# Patient Record
Sex: FEMALE | Race: WHITE
[De-identification: names, ages, dates, MRNs, and addresses within clinical notes are randomized per-mention and may not be internally consistent; named-entity substitution may affect disease eponyms.]

---

## 2017-06-17 ENCOUNTER — HEALTH MAINTENANCE LETTER (OUTPATIENT)
Age: 52
End: 2017-06-17

## 2021-11-29 ENCOUNTER — TRANSFERRED RECORDS (OUTPATIENT)
Dept: HEALTH INFORMATION MANAGEMENT | Facility: CLINIC | Age: 56
End: 2021-11-29
Payer: COMMERCIAL

## 2021-12-03 ENCOUNTER — TRANSCRIBE ORDERS (OUTPATIENT)
Dept: OTHER | Age: 56
End: 2021-12-03
Payer: COMMERCIAL

## 2021-12-03 DIAGNOSIS — K74.60 CIRRHOSIS OF LIVER WITH ASCITES, UNSPECIFIED HEPATIC CIRRHOSIS TYPE (H): ICD-10-CM

## 2021-12-03 DIAGNOSIS — R18.8 CIRRHOSIS OF LIVER WITH ASCITES, UNSPECIFIED HEPATIC CIRRHOSIS TYPE (H): ICD-10-CM

## 2021-12-03 DIAGNOSIS — B18.2 CHRONIC HEPATITIS C WITHOUT HEPATIC COMA (H): Primary | ICD-10-CM

## 2021-12-10 ENCOUNTER — TELEPHONE (OUTPATIENT)
Dept: GASTROENTEROLOGY | Facility: CLINIC | Age: 56
End: 2021-12-10
Payer: COMMERCIAL

## 2021-12-10 DIAGNOSIS — B19.20 HEPATITIS C: Primary | ICD-10-CM

## 2021-12-10 DIAGNOSIS — B19.20 DECOMPENSATED CIRRHOSIS RELATED TO HEPATITIS C VIRUS (HCV) (H): ICD-10-CM

## 2021-12-10 DIAGNOSIS — K74.69 DECOMPENSATED CIRRHOSIS RELATED TO HEPATITIS C VIRUS (HCV) (H): ICD-10-CM

## 2021-12-10 DIAGNOSIS — K70.31 ALCOHOLIC CIRRHOSIS OF LIVER WITH ASCITES (H): ICD-10-CM

## 2021-12-10 NOTE — TELEPHONE ENCOUNTER
Returned patient's call.  Patient say she had recent paracentesis 2 weeks ago at [facility] and has been filling up.  Patient says she is really sore at site were needle was inserted so is hesitant to get another paracentesis.  Was seen by PCP and started on Thursday spironolactone 100 mg and furosemide 40 mg daily. Eating small amounts at a time.  Comfort level is worse at night.  Has hernia and pushes out from fullness.   Has acid reflux as well.      Is requesting a sooner appointment.  Let her know Grete is okay to see her.     Nery KAT LPN  Hepatology Clinic     [facility]    Phone Message    May a detailed message be left on voicemail: yes     Reason for Call: Symptoms or Concerns     Current symptom or concern: stomach is filling up with fluid    appt is 1/6/2022 and on wait list.  Please call to discuss    Symptoms have been present for:  1 week(s)    Has patient previously been seen for this? No      Are there any new or worsening symptoms? Yes:        Action Taken: Message routed to:  [facility] hep    Travel Screening: Not Applicable

## 2021-12-14 ENCOUNTER — LAB (OUTPATIENT)
Dept: LAB | Facility: CLINIC | Age: 56
End: 2021-12-14
Attending: PHYSICIAN ASSISTANT
Payer: COMMERCIAL

## 2021-12-14 ENCOUNTER — PRE VISIT (OUTPATIENT)
Dept: GASTROENTEROLOGY | Facility: CLINIC | Age: 56
End: 2021-12-14
Payer: COMMERCIAL

## 2021-12-14 ENCOUNTER — OFFICE VISIT (OUTPATIENT)
Dept: GASTROENTEROLOGY | Facility: CLINIC | Age: 56
End: 2021-12-14
Attending: PHYSICIAN ASSISTANT
Payer: COMMERCIAL

## 2021-12-14 VITALS
SYSTOLIC BLOOD PRESSURE: 109 MMHG | DIASTOLIC BLOOD PRESSURE: 66 MMHG | WEIGHT: 131.6 LBS | OXYGEN SATURATION: 96 % | HEIGHT: 60 IN | BODY MASS INDEX: 25.84 KG/M2 | HEART RATE: 66 BPM

## 2021-12-14 DIAGNOSIS — K74.60 CIRRHOSIS OF LIVER WITH ASCITES, UNSPECIFIED HEPATIC CIRRHOSIS TYPE (H): ICD-10-CM

## 2021-12-14 DIAGNOSIS — B19.20 DECOMPENSATED CIRRHOSIS RELATED TO HEPATITIS C VIRUS (HCV) (H): ICD-10-CM

## 2021-12-14 DIAGNOSIS — R18.8 CIRRHOSIS OF LIVER WITH ASCITES, UNSPECIFIED HEPATIC CIRRHOSIS TYPE (H): ICD-10-CM

## 2021-12-14 DIAGNOSIS — B18.2 CHRONIC HEPATITIS C WITHOUT HEPATIC COMA (H): ICD-10-CM

## 2021-12-14 DIAGNOSIS — K74.69 DECOMPENSATED CIRRHOSIS RELATED TO HEPATITIS C VIRUS (HCV) (H): ICD-10-CM

## 2021-12-14 DIAGNOSIS — B18.2 CHRONIC HEPATITIS C WITHOUT HEPATIC COMA (H): Primary | ICD-10-CM

## 2021-12-14 LAB
ALBUMIN SERPL-MCNC: 3 G/DL (ref 3.4–5)
ALP SERPL-CCNC: 164 U/L (ref 40–150)
ALT SERPL W P-5'-P-CCNC: 32 U/L (ref 0–50)
ANION GAP SERPL CALCULATED.3IONS-SCNC: 7 MMOL/L (ref 3–14)
AST SERPL W P-5'-P-CCNC: 44 U/L (ref 0–45)
BILIRUB DIRECT SERPL-MCNC: 0.7 MG/DL (ref 0–0.2)
BILIRUB SERPL-MCNC: 1.5 MG/DL (ref 0.2–1.3)
BUN SERPL-MCNC: 7 MG/DL (ref 7–30)
CALCIUM SERPL-MCNC: 8.2 MG/DL (ref 8.5–10.1)
CHLORIDE BLD-SCNC: 97 MMOL/L (ref 94–109)
CO2 SERPL-SCNC: 29 MMOL/L (ref 20–32)
CREAT SERPL-MCNC: 0.67 MG/DL (ref 0.52–1.04)
ERYTHROCYTE [DISTWIDTH] IN BLOOD BY AUTOMATED COUNT: 13.9 % (ref 10–15)
GFR SERPL CREATININE-BSD FRML MDRD: >90 ML/MIN/1.73M2
GLUCOSE BLD-MCNC: 192 MG/DL (ref 70–99)
HBA1C MFR BLD: 4.5 % (ref 0–5.6)
HCT VFR BLD AUTO: 37.9 % (ref 35–47)
HGB BLD-MCNC: 12.9 G/DL (ref 11.7–15.7)
INR PPP: 1.43 (ref 0.85–1.15)
MCH RBC QN AUTO: 32.7 PG (ref 26.5–33)
MCHC RBC AUTO-ENTMCNC: 34 G/DL (ref 31.5–36.5)
MCV RBC AUTO: 96 FL (ref 78–100)
PLATELET # BLD AUTO: 50 10E3/UL (ref 150–450)
POTASSIUM BLD-SCNC: 3.5 MMOL/L (ref 3.4–5.3)
PROT SERPL-MCNC: 7.2 G/DL (ref 6.8–8.8)
RBC # BLD AUTO: 3.94 10E6/UL (ref 3.8–5.2)
SODIUM SERPL-SCNC: 133 MMOL/L (ref 133–144)
WBC # BLD AUTO: 3.3 10E3/UL (ref 4–11)

## 2021-12-14 PROCEDURE — 83036 HEMOGLOBIN GLYCOSYLATED A1C: CPT | Performed by: PATHOLOGY

## 2021-12-14 PROCEDURE — G0463 HOSPITAL OUTPT CLINIC VISIT: HCPCS

## 2021-12-14 PROCEDURE — 99205 OFFICE O/P NEW HI 60 MIN: CPT | Performed by: PHYSICIAN ASSISTANT

## 2021-12-14 PROCEDURE — 85027 COMPLETE CBC AUTOMATED: CPT | Performed by: PATHOLOGY

## 2021-12-14 PROCEDURE — 85610 PROTHROMBIN TIME: CPT | Performed by: PATHOLOGY

## 2021-12-14 PROCEDURE — 87522 HEPATITIS C REVRS TRNSCRPJ: CPT | Mod: 90 | Performed by: PATHOLOGY

## 2021-12-14 PROCEDURE — 87902 NFCT AGT GNTYP ALYS HEP C: CPT | Mod: 90 | Performed by: PATHOLOGY

## 2021-12-14 PROCEDURE — 99000 SPECIMEN HANDLING OFFICE-LAB: CPT | Performed by: PATHOLOGY

## 2021-12-14 PROCEDURE — 82248 BILIRUBIN DIRECT: CPT | Performed by: PATHOLOGY

## 2021-12-14 PROCEDURE — 36415 COLL VENOUS BLD VENIPUNCTURE: CPT | Performed by: PATHOLOGY

## 2021-12-14 PROCEDURE — 80053 COMPREHEN METABOLIC PANEL: CPT | Performed by: PATHOLOGY

## 2021-12-14 RX ORDER — SPIRONOLACTONE 100 MG/1
100 TABLET, FILM COATED ORAL
COMMUNITY
Start: 2021-12-07 | End: 2021-12-14

## 2021-12-14 RX ORDER — FUROSEMIDE 40 MG
40 TABLET ORAL
COMMUNITY
Start: 2021-12-07 | End: 2021-12-14

## 2021-12-14 RX ORDER — FUROSEMIDE 40 MG
40 TABLET ORAL 2 TIMES DAILY
Qty: 60 TABLET | Refills: 5 | Status: SHIPPED | OUTPATIENT
Start: 2021-12-14 | End: 2021-12-22

## 2021-12-14 RX ORDER — SPIRONOLACTONE 100 MG/1
100 TABLET, FILM COATED ORAL 2 TIMES DAILY
Qty: 60 TABLET | Refills: 5 | Status: SHIPPED | OUTPATIENT
Start: 2021-12-14 | End: 2021-12-22

## 2021-12-14 ASSESSMENT — PAIN SCALES - GENERAL: PAINLEVEL: NO PAIN (0)

## 2021-12-14 ASSESSMENT — MIFFLIN-ST. JEOR: SCORE: 1102.4

## 2021-12-14 NOTE — TELEPHONE ENCOUNTER
RECORDS RECEIVED FROM: Care Everywhere   Appt Date: 12.14.2021   NOTES STATUS DETAILS   OFFICE NOTE from referring provider Care Everywhere 11.29.2021 Adela Sow PA-C   OFFICE NOTES from other specialists N/A    DISCHARGE SUMMARY from hospital N/A    MEDICATION LIST Care Everywhere    LIVER BIOSPY (IF APPLICABLE)      PATHOLOGY REPORTS  N/A    IMAGING     ENDOSCOPY (IF AVAILABLE) N/A    COLONOSCOPY (IF AVAILABLE) Care Everywhere 01.29.2019   ULTRASOUND LIVER N/A    CT OF ABDOMEN N/A    MRI OF LIVER N/A    FIBROSCAN, US ELASTOGRAPHY, FIBROSIS SCAN, MR ELASTOGRAPHY N/A    LABS     HEPATIC PANEL (LIVER PANEL) Care Everywhere 11.19.2021   BASIC METABOLIC PANEL Care Everywhere 11.19.2019   COMPLETE METABOLIC PANEL Care Everywhere 01.07.2019   COMPLETE BLOOD COUNT (CBC) Care Everywhere 01.07.2019   INTERNATIONAL NORMALIZED RATIO (INR) Care Everywhere 11.24.2021   HEPATITIS C ANTIBODY N/A    HEPATITIS C VIRAL LOAD/PCR N/A    HEPATITIS C GENOTYPE N/A    HEPATITIS B SURFACE ANTIGEN N/A    HEPATITIS B SURFACE ANTIBODY N/A    HEPATITIS B DNA QUANT LEVEL N/A    HEPATITIS B CORE ANTIBODY N/A

## 2021-12-14 NOTE — PROGRESS NOTES
Hepatology Clinic Note  Mu Gutierrez   Date of Birth 1965  Date of Service 12/14/2021    REASON FOR CONSULTATION: Hepatitis C  REFERRING PROVIDER: Nicanor Allred MD          Assessment/plan:   Mu Gutierrez is a 56 year old female with Hepatitis C cirrhosis, genotype 1a, treatment naive and positive Hep B core antibody. MELD-Na 16. She has recently decompensated with ascites and underwent one paracentesis recently. No overt hepatic encephalopathy. We discussed cirrhosis and complications.     We discussed the natural course of Hepatitis C virus and the benefits of treating the disease. We discussed the treatment regimen and medication side effects. Patient would be a good candidate for Hepatitis C treatment to prevent worsening decompensation and to prevent extrahepatic manifestations of the disease. She is not interested in any discussion of liver transplantation. She is due for both HCC screening and variceal screening.     # Ascites:   - Increase furosemide 40 mg twice daily   - Increase spironolactone 100 mg twice daily   - BMP in one week     # Chronic hepatitis C:   - Will plan to send prescription for Hepatitis C x 6 months or 3 months with Ribavirin pending genotype  - Try to obtain outside [facility] records/labs for genotype  - Repeat HCV RNA at the end of treatment and 12-weeks after finishing treatment to determine SVR    # Complete avoidance from alcohol     # Variceal screening given decompensation:    - EGD in the near future     # HCC Screening:   - US abdomen complete     # Follow-up in Hepatology Clinic in 3 months    Brooke Gerard PA-C   [facility] Hepatology    -----------------------------------------------------       HPI:   Mu Gutierrez is a 56 year old female  presenting for evaluation and treatment of Hepatitis C.     Hepatitis C   -Genotype unknown   -Diagnosed: 2010  -History: ? Ex- with Hep C  -Prior biopsy: no   -Prior treatments: naive     Cirrhosis   History  of ascites s/p paracentesis  No HE or history of GI bleed  Diagnosed   EGD: 2019, small EV    Patient was diagnosed with Hepatitis C in . Diagnosed with cirrhosis a few years ago. Had a paracentesis once a few years and started [id number] again.     Had a paracentesis on 21, had a bad experience with needles/catheters moving.     Eating small amounts through day. Unknown weight changes with fluid.     Patient denies jaundice, lower extremity edema, abdominal distension or confusion.      Usually 1-2 bowel movements a day. Patient also denies melena, hematochezia or hematemesis.    Patient denies fevers, sweats or chills.    PMH: GERD, hernia, hemorrhoids (bleeding)     [facility]: appendectomy,      Medications:     Smoke 4-5 cigarettes a week. Last drank alcohol 2-3 months ago. Would have alcohol on specially occasion. Denies regular use or abuse. No previous IV/IN drug use. Ex with Hepatitis C. Currently work at [employer]. Patient lives with sister. No known family history of liver disease or liver transplant.     She is not vaccinated against COVID-19. States she is allergy to vaccinations.     Lab work-up thus far:   HCV RNA: [id number]  HBV SAb: 1.7 - natural immunity   HBV SAg: nonreactive  HBV CAb: positive  HIV: nonreactive      Medical hx Surgical hx   No past medical history on file. No past surgical history on file.              Medications:     Current Outpatient Medications   Medication     furosemide (LASIX) 40 MG tablet     spironolactone (ALDACTONE) 100 MG tablet     NO ACTIVE MEDICATIONS     No current facility-administered medications for this visit.            Allergies:     Allergies   Allergen Reactions     Shellfish-Derived Products             Social History:     Social History     Socioeconomic History     Marital status: Patient Declined     Spouse name: Not on file     Number of children: Not on file     Years of education: Not on file     Highest education level: Not on  "file   Occupational History     Not on file   Tobacco Use     Smoking status: Current Some Day Smoker     Packs/day: 0.50     Types: Cigarettes     Smokeless tobacco: Never Used   Substance and Sexual Activity     Alcohol use: No     Drug use: No     Sexual activity: Never   Other Topics Concern     Parent/sibling w/ CABG, MI or angioplasty before 65F 55M? Not Asked   Social History Narrative     Not on file     Social Determinants of Health     Financial Resource Strain: Not on file   Food Insecurity: Not on file   Transportation Needs: Not on file   Physical Activity: Not on file   Stress: Not on file   Social Connections: Not on file   Intimate Partner Violence: Not on file   Housing Stability: Not on file            Family History:     Family History   Problem Relation Age of Onset     Cancer Maternal Grandfather             Review of Systems:   GEN: See HPI  HEENT: No change in vision or hearing, mouth sores, dysphagia, lymph nodes  Resp: No shortness of breath, coughing, hx of asthma  CV: No chest pain, palpitations, syncope   GI: See HPI  : No dysuria, history of stones, urine color    Skin: No rash; no pruritus or psoriasis  MS: No arthralgias, myalgias, joint swelling  Neuro: No memory changes, confusion, numbness    Heme: No difficulty clotting, bruising, bleeding  Psych:  No anxiety, depression, agitation          Physical Exam:   VS:  /66   Pulse 66   Ht 1.53 m (5' 0.25\")   Wt 59.7 kg (131 lb 9.6 oz)   SpO2 96%   BMI 25.49 kg/m        Gen: A&Ox3, NAD, thin  HEENT: non-icteric   CV: RRR, no overt murmurs  Lung: CTA Bilatererally, no wheezing or crackles.   Lym- no palpable lymphadenopathy  Abd: soft, NT, ND, moderate ascites   Ext: no edema, intact pulses.   Skin: No rash no palmar erythema, telangiectasias or jaundice  Neuro: grossly intact, no asterixis   Psych: appropriate mood and affects         Data:   Reviewed in person and significant for:    Lab Results   Component Value Date    NA " 133 12/14/2021      Lab Results   Component Value Date    POTASSIUM 3.5 12/14/2021     Lab Results   Component Value Date    CHLORIDE 97 12/14/2021     Lab Results   Component Value Date    CO2 29 12/14/2021     Lab Results   Component Value Date    BUN 7 12/14/2021     Lab Results   Component Value Date    CR 0.67 12/14/2021       Lab Results   Component Value Date    WBC 3.3 12/14/2021    WBC 6.5 05/18/2010     Lab Results   Component Value Date    HGB 12.9 12/14/2021    HGB 14.2 05/18/2010     Lab Results   Component Value Date    HCT 37.9 12/14/2021    HCT 42.0 05/18/2010     Lab Results   Component Value Date    MCV 96 12/14/2021    MCV 93 05/18/2010     Lab Results   Component Value Date    PLT 50 12/14/2021     05/18/2010       Lab Results   Component Value Date    AST 44 12/14/2021     05/18/2010     Lab Results   Component Value Date    ALT 32 12/14/2021     05/18/2010     Lab Results   Component Value Date    BILICONJ 0.0 05/18/2010      Lab Results   Component Value Date    BILITOTAL 1.5 12/14/2021    BILITOTAL 0.3 05/18/2010       Lab Results   Component Value Date    ALBUMIN 3.0 12/14/2021    ALBUMIN 3.9 05/18/2010     Lab Results   Component Value Date    PROTTOTAL 7.2 12/14/2021    PROTTOTAL 7.9 05/18/2010      Lab Results   Component Value Date    ALKPHOS 164 12/14/2021    ALKPHOS 87 05/18/2010       Lab Results   Component Value Date    INR 1.43 12/14/2021    INR 1.03 05/18/2010     No recent abdominal imaging outside of US limited for ascites.

## 2021-12-14 NOTE — PATIENT INSTRUCTIONS
Limit sodium to 2000 mg sodium  Increase protein intake in diet to 60-70 gm day   No alcohol. No ibuprofen.   Increase diuretics to   Furosemide 40 mg twice a day  Spironolactone 100 mg twice a day    Repeat labs in one week    Follow up with PCP about blood glucose levels.     It was a pleasure to see you at your recent visit. Please let me know if you have any questions or concerns.     Clinic Staff - [phone] option 3     Sincerely,     DALLIN Paulino  [address] [facility], Mail [id number]  [place]  [address].

## 2021-12-14 NOTE — NURSING NOTE
"Chief Complaint   Patient presents with     Consult     Hep c     /66   Pulse 66   Ht 1.53 m (5' 0.25\")   Wt 59.7 kg (131 lb 9.6 oz)   SpO2 96%   BMI 25.49 kg/m       Rinku Flowers MA  "

## 2021-12-16 LAB
HCV RNA SERPL NAA+PROBE-ACNC: 67 IU/ML
HCV RNA SERPL NAA+PROBE-LOG IU: 1.8 {LOG_IU}/ML

## 2021-12-17 LAB — HCV GENTYP SERPL NAA+PROBE: NORMAL

## 2021-12-20 ENCOUNTER — ANCILLARY PROCEDURE (OUTPATIENT)
Dept: ULTRASOUND IMAGING | Facility: CLINIC | Age: 56
End: 2021-12-20
Attending: PHYSICIAN ASSISTANT
Payer: COMMERCIAL

## 2021-12-20 DIAGNOSIS — B18.2 CHRONIC HEPATITIS C WITHOUT HEPATIC COMA (H): ICD-10-CM

## 2021-12-20 DIAGNOSIS — R18.8 CIRRHOSIS OF LIVER WITH ASCITES, UNSPECIFIED HEPATIC CIRRHOSIS TYPE (H): ICD-10-CM

## 2021-12-20 DIAGNOSIS — K74.60 CIRRHOSIS OF LIVER WITH ASCITES, UNSPECIFIED HEPATIC CIRRHOSIS TYPE (H): ICD-10-CM

## 2021-12-20 PROCEDURE — 76700 US EXAM ABDOM COMPLETE: CPT | Mod: GC | Performed by: STUDENT IN AN ORGANIZED HEALTH CARE EDUCATION/TRAINING PROGRAM

## 2021-12-21 RX ORDER — LEDIPASVIR AND SOFOSBUVIR 90; 400 MG/1; MG/1
1 TABLET, FILM COATED ORAL DAILY
Qty: 28 TABLET | Refills: 2 | Status: SHIPPED | OUTPATIENT
Start: 2021-12-21

## 2021-12-21 RX ORDER — RIBAVIRIN 200 MG/1
200 TABLET, FILM COATED ORAL
Qty: 90 TABLET | Refills: 2 | Status: SHIPPED | OUTPATIENT
Start: 2021-12-21

## 2021-12-22 DIAGNOSIS — K74.60 CIRRHOSIS OF LIVER WITH ASCITES, UNSPECIFIED HEPATIC CIRRHOSIS TYPE (H): ICD-10-CM

## 2021-12-22 DIAGNOSIS — B18.2 CHRONIC HEPATITIS C WITHOUT HEPATIC COMA (H): ICD-10-CM

## 2021-12-22 DIAGNOSIS — R18.8 CIRRHOSIS OF LIVER WITH ASCITES, UNSPECIFIED HEPATIC CIRRHOSIS TYPE (H): ICD-10-CM

## 2021-12-22 RX ORDER — SPIRONOLACTONE 100 MG/1
50 TABLET, FILM COATED ORAL DAILY
Qty: 60 TABLET | Refills: 5 | COMMUNITY
Start: 2021-12-22

## 2021-12-22 RX ORDER — FUROSEMIDE 40 MG
20 TABLET ORAL DAILY
Qty: 60 TABLET | Refills: 5 | COMMUNITY
Start: 2021-12-22

## 2021-12-23 ENCOUNTER — TELEPHONE (OUTPATIENT)
Dept: GASTROENTEROLOGY | Facility: CLINIC | Age: 56
End: 2021-12-23
Payer: COMMERCIAL

## 2021-12-23 NOTE — TELEPHONE ENCOUNTER
Prior Authorization Approval    Authorization Effective Date: 11/23/2021  Authorization Expiration Date: 3/17/2022  Medication: Harvoni  Approved Dose/Quantity: 12 weeks  Reference #: [id number]   Insurance Company: [employer] - Phone [phone] Fax [phone]  Expected CoPay: $7, $7     CoPay Card Available: No    Foundation Assistance Needed: No  Which Pharmacy is filling the prescription (Not needed for infusion/clinic administered): [facility] - [place], [place] - [address]  Pharmacy Notified: Yes  Patient Notified: Yes

## 2021-12-23 NOTE — TELEPHONE ENCOUNTER
PA Initiation    Medication: Harvoni  Insurance Company: [employer] - Phone [phone] Fax [phone]  Pharmacy Filling the Rx: [facility] - [place], [place] - [facility] [address]  Filling Pharmacy Phone: [phone]  Filling Pharmacy Fax: [phone]  Start Date: 12/23/2021    Thank you!  [facility]

## 2021-12-25 ENCOUNTER — HEALTH MAINTENANCE LETTER (OUTPATIENT)
Age: 56
End: 2021-12-25

## 2021-12-30 ENCOUNTER — CARE COORDINATION (OUTPATIENT)
Dept: GASTROENTEROLOGY | Facility: CLINIC | Age: 56
End: 2021-12-30
Payer: COMMERCIAL

## 2021-12-30 DIAGNOSIS — B18.2 CHRONIC HEPATITIS C WITHOUT HEPATIC COMA (H): Primary | ICD-10-CM

## 2021-12-30 NOTE — PROGRESS NOTES
Connected with patient for f/u on Hep C treatment delivery/start status. Patient received their Harvoni medication and are ready to start treatment. Patient will be on Hep C treatment for 12 weeks. Patient reports no recent changes in health, hospitalizations or recent changes in medications. Patient did discuss with a pharmacist. Reviewed the following Hep C POC and education with patient.     Hepatitis C Treatment  Treatment: Harvoni and Ribavirin x 12 weeks  Genotype: 1a  Stage Fibrosis: F4  Previous Treatment Outcome: Naive    Please have labs drawn as close to the date indicated at an [facility].    Start Date: 12/30/21    Week 2  Hgb Lab Due: 1/13/2022    Week 3  Hgb Lab Due: 1/20/2022    Week 4  Hgb, BMP & Hepatic Panel Labs Due: 1/27/2022    Week 8  Hgb Lab Due: 2/24/2022    Week 12 - End of Treatment  Hgb, HCV RNA Quant Lab Due: 3/24/2022  Please have this lab drawn on or within a week after this date 3/24/22. You will need to repeat this lab 3 months after completing treatment to ensure you have cleared the virus. Please see date for the final lab below. You do not need to fast for this lab.       3 Months Post Treatment  Hgb,Hepatic panel,HCV RNA Quant Lab Due: 6/22/2022  Please have this lab drawn on the specified date of 6/22/22 or within a week after. This final lab will determine if you have cleared the Hepatitis C virus with Harvoni treatment. Without this final lab, we will be unable to determine if treatment was successful. You do not need to fast for this lab.             Educational information to patient on Hep C treatment:     -Contact the [facility] Hepatology clinic and speak with clinical RN prior to starting any new prescribed or OTC medications.   -Take medications exactly as prescribed, do not change dose or stop taking without consulting your provider.   -Take Medication one time each day with food  -If you miss a dose of medication, then take it as soon as you remember on the  same day. If not remembered on the same day, then skip the dose and take the next dose at the usual time. Do not take more than the recommended dose. Contact the clinic if you miss a dose.    Please contact the pharmacy 1-2 weeks prior to needing a medication refill.      Side Effects  The most common side effects of Hep C medication treatment can include:  -tiredness  -headache  -nausea  Notify the clinic of any side effects that bother you or that do not go away.    Contact clinic for rash, itching or unmanageable nausea.     How to store Hep C Treatment Medications  -Store Medication at room temperature below 86 degrees F  -Keep Medication in it's original container  -Do not use Medication if the seal is broken or missing     General information  It is not known if treatment will prevent you from infecting another person or reinfecting yourself with the hepatitis C virus during treatment. It is best that as soon as you start treatment to buy a new toothbrush, disposable razors (if you use a rotating shaver you do not need to buy a new one) and nail clippers. If you wear dentures, you should soak your dentures in 70-90% Isopropyl solution for 5 minutes one time within the first week of starting treatment. After dentures are done soaking, rinse your dentures off thoroughly with water. If you check your blood sugar at home, please dispose of the fingerstick needle after each use and DO NOT REUSE the insulin needles. These items should not be shared with anyone.          If you have any questions, please contact the main clinic at [phone] or your clinical RN at [phone]. We appreciate you choosing the [facility] clinic for your treatment. Patient agrees to Hep C treatment POC and verbalizes understanding. Patient will receive a copy of treatment plan in the mail, address verified with patient. Patient has no further questions or concerns. Hep C care team updated on patient status.    Kimberly  Franca RN Care Coordinator  [facility]  Hepatology Clinic/Specialty Program

## 2021-12-31 NOTE — PROGRESS NOTES
Nohemy Gerard PA-C and [facility] pharmacist for pt to take Ribavirin 600 mg daily, rather than in divided doses. Patient is aware. Hepatitis C treatment began 12/30/21 as charted. Will follow labs per protocol. Treatment plan sent to pt.

## 2022-01-11 ENCOUNTER — LAB (OUTPATIENT)
Dept: LAB | Facility: CLINIC | Age: 57
End: 2022-01-11
Attending: PHYSICIAN ASSISTANT
Payer: COMMERCIAL

## 2022-01-11 DIAGNOSIS — B18.2 CHRONIC HEPATITIS C WITHOUT HEPATIC COMA (H): ICD-10-CM

## 2022-01-11 LAB
ALBUMIN SERPL-MCNC: 3.5 G/DL (ref 3.4–5)
ALP SERPL-CCNC: 139 U/L (ref 40–150)
ALT SERPL W P-5'-P-CCNC: 46 U/L (ref 0–50)
ANION GAP SERPL CALCULATED.3IONS-SCNC: 9 MMOL/L (ref 3–14)
AST SERPL W P-5'-P-CCNC: 56 U/L (ref 0–45)
BILIRUB DIRECT SERPL-MCNC: 0.8 MG/DL (ref 0–0.2)
BILIRUB SERPL-MCNC: 2 MG/DL (ref 0.2–1.3)
BUN SERPL-MCNC: 10 MG/DL (ref 7–30)
CALCIUM SERPL-MCNC: 8.7 MG/DL (ref 8.5–10.1)
CHLORIDE BLD-SCNC: 95 MMOL/L (ref 94–109)
CO2 SERPL-SCNC: 27 MMOL/L (ref 20–32)
CREAT SERPL-MCNC: 0.63 MG/DL (ref 0.52–1.04)
GFR SERPL CREATININE-BSD FRML MDRD: >90 ML/MIN/1.73M2
GLUCOSE BLD-MCNC: 119 MG/DL (ref 70–99)
HGB BLD-MCNC: 12.7 G/DL (ref 11.7–15.7)
POTASSIUM BLD-SCNC: 3.5 MMOL/L (ref 3.4–5.3)
PROT SERPL-MCNC: 7.8 G/DL (ref 6.8–8.8)
SODIUM SERPL-SCNC: 131 MMOL/L (ref 133–144)

## 2022-01-11 PROCEDURE — 85018 HEMOGLOBIN: CPT | Performed by: PATHOLOGY

## 2022-01-11 PROCEDURE — 36415 COLL VENOUS BLD VENIPUNCTURE: CPT | Performed by: PATHOLOGY

## 2022-01-11 PROCEDURE — 80053 COMPREHEN METABOLIC PANEL: CPT | Performed by: PATHOLOGY

## 2022-01-11 PROCEDURE — 82248 BILIRUBIN DIRECT: CPT | Performed by: PATHOLOGY

## 2022-01-12 ENCOUNTER — TELEPHONE (OUTPATIENT)
Dept: GASTROENTEROLOGY | Facility: CLINIC | Age: 57
End: 2022-01-12
Payer: COMMERCIAL

## 2022-01-12 NOTE — TELEPHONE ENCOUNTER
Screening Questions  1. Are you active on mychart? Y    2. What insurance is in the chart? [employer]     2.  Ordering/Referring Provider: Moustapha    3. BMI 23.2, If greater than 40 review exclusion criteria also will need EXTENDED PREP    4.  Respiratory Screening (If yes to any of the following HOSPITAL setting only):     Do you use daily home oxygen? N  Do you have mod to severe Obstructive Sleep Apnea? N   Do you have Pulmonary Hypertension? N   Do you have UNCONTROLLED asthma? N    5. Have you had a heart or lung transplant? N  (If yes, please review exclusion criteria)    6. Are you currently on dialysis?N  (If yes, schedule in HOSPITAL setting only)(If yes, please send Golytely prep)    7. Do you have chronic kidney disease? N (If yes, please send Golytely prep)    7. Have you had a stroke or Transient ischemic attack (TIA) within 6 months? N (If yes, do not schedule at [facility])    8. In the past 6 months, have you had any heart related issues including cardiomyopathy or heart attack? N (If yes, please review exclusion criteria)           If yes, did it require cardiac stenting or other implantable device?N  (If yes, please review exclusion criteria)      9. Do you have any implantable devices in your body (pacemaker, defib, LVAD)? N (If yes, schedule at [facility])    10. Do you take nitroglycerin? If yes, how often? N (if yes, schedule at HOSPITAL setting)    11. Are you currently taking any blood thinners?N (If yes- inform patient to follow up with PCP or provider for follow up instructions)     12. Are you a diabetic? N (If yes, please send Golytely prep)    13. (Females) Are you currently pregnant? NA  If yes, how many weeks?      15. Are you taking any prescription pain medications on a routine schedule? N If yes, MAC sedation and patient will need EXTENDED PREP.    16. Do you have any chemical dependencies such as alcohol, street drugs, or methadone? N If yes, MAC sedation and patient will need EXTENDED PREP    17. Do  you have any history of post-traumatic stress syndrome, severe anxiety or history of psychosis? N  If yes, MAC sedation.     18. Do you transfer independently? Yes    19.  Do you have any issues with constipation? NA   If yes, pt will need EXTENDED PREP     20. Preferred Pharmacy for Pre Prescription [facility] [address] [place] [address] [id number] [facility]    Scheduling Details    Which Colonoscopy Prep was Sent?: NA  Type of Procedure Scheduled: EGD  Surgeon: Leventhal  Date of Procedure: 3/23/22  Location: [facility]  Caller (Please ask for phone number if not scheduled by patient): Mu      Sedation Type: CS  Conscious Sedation- Needs  for 6 hours after the procedure  MAC/General-Needs  for 24 hours after procedure    Pre-op Required at [facility] [place] and OR for MAC sedation: NA  (if yes advise patient they will need a pre-op prior to procedure)      Informed patient they will need an adult  Yes  Cannot take any type of public or medical transportation alone    Pre-Procedure Covid test to be completed at [facility] or Externally: Externally at [facility]    Confirmed Nurse will call to complete assessment Yes    Additional comments:  ([facility] PATIENTS NEED EXTENDED PREP)

## 2022-01-25 ENCOUNTER — LAB (OUTPATIENT)
Dept: LAB | Facility: CLINIC | Age: 57
End: 2022-01-25
Attending: PHYSICIAN ASSISTANT
Payer: COMMERCIAL

## 2022-01-25 DIAGNOSIS — R18.8 CIRRHOSIS OF LIVER WITH ASCITES, UNSPECIFIED HEPATIC CIRRHOSIS TYPE (H): ICD-10-CM

## 2022-01-25 DIAGNOSIS — K74.60 CIRRHOSIS OF LIVER WITH ASCITES, UNSPECIFIED HEPATIC CIRRHOSIS TYPE (H): ICD-10-CM

## 2022-01-25 DIAGNOSIS — B18.2 CHRONIC HEPATITIS C WITHOUT HEPATIC COMA (H): ICD-10-CM

## 2022-01-25 LAB
ANION GAP SERPL CALCULATED.3IONS-SCNC: 8 MMOL/L (ref 3–14)
BUN SERPL-MCNC: 9 MG/DL (ref 7–30)
CALCIUM SERPL-MCNC: 8.4 MG/DL (ref 8.5–10.1)
CHLORIDE BLD-SCNC: 93 MMOL/L (ref 94–109)
CO2 SERPL-SCNC: 26 MMOL/L (ref 20–32)
CREAT SERPL-MCNC: 0.6 MG/DL (ref 0.52–1.04)
FERRITIN SERPL-MCNC: 128 NG/ML (ref 8–252)
GFR SERPL CREATININE-BSD FRML MDRD: >90 ML/MIN/1.73M2
GLUCOSE BLD-MCNC: 97 MG/DL (ref 70–99)
HGB BLD-MCNC: 11.5 G/DL (ref 11.7–15.7)
POTASSIUM BLD-SCNC: 4.1 MMOL/L (ref 3.4–5.3)
SODIUM SERPL-SCNC: 127 MMOL/L (ref 133–144)

## 2022-01-25 PROCEDURE — 85018 HEMOGLOBIN: CPT | Performed by: PATHOLOGY

## 2022-01-25 PROCEDURE — 80048 BASIC METABOLIC PNL TOTAL CA: CPT | Performed by: PATHOLOGY

## 2022-01-25 PROCEDURE — 82728 ASSAY OF FERRITIN: CPT | Performed by: PATHOLOGY

## 2022-01-25 PROCEDURE — 36415 COLL VENOUS BLD VENIPUNCTURE: CPT | Performed by: PATHOLOGY

## 2022-01-26 ENCOUNTER — TELEPHONE (OUTPATIENT)
Dept: GASTROENTEROLOGY | Facility: CLINIC | Age: 57
End: 2022-01-26
Payer: COMMERCIAL

## 2022-01-26 DIAGNOSIS — B19.20 DECOMPENSATED CIRRHOSIS RELATED TO HEPATITIS C VIRUS (HCV) (H): Primary | ICD-10-CM

## 2022-01-26 DIAGNOSIS — K74.69 DECOMPENSATED CIRRHOSIS RELATED TO HEPATITIS C VIRUS (HCV) (H): Primary | ICD-10-CM

## 2022-01-26 NOTE — TELEPHONE ENCOUNTER
Called patient. Discussed lab results.  Let pt know if she is not having any fluid in abdomen or legs, Grete would like her to go down to furosemide 20 mg and spironolactone 50 mg. Patient says she no longer has any fluid in legs or abdomen.     Please let her know her electrolytes are off and 2000 sodium diet is important.     She should get BMP for next week.     Also reminded her no amount of alcohol is okay.     Patient verbalized understanding and had no further questions or concerns.    Nery KAT LPN  Hepatology Clinic .

## 2022-02-03 ENCOUNTER — LAB (OUTPATIENT)
Dept: LAB | Facility: CLINIC | Age: 57
End: 2022-02-03
Payer: COMMERCIAL

## 2022-02-03 DIAGNOSIS — B19.20 DECOMPENSATED CIRRHOSIS RELATED TO HEPATITIS C VIRUS (HCV) (H): ICD-10-CM

## 2022-02-03 DIAGNOSIS — K74.69 DECOMPENSATED CIRRHOSIS RELATED TO HEPATITIS C VIRUS (HCV) (H): ICD-10-CM

## 2022-02-03 DIAGNOSIS — B18.2 CHRONIC HEPATITIS C WITHOUT HEPATIC COMA (H): ICD-10-CM

## 2022-02-03 LAB
ANION GAP SERPL CALCULATED.3IONS-SCNC: 6 MMOL/L (ref 3–14)
BUN SERPL-MCNC: 9 MG/DL (ref 7–30)
CALCIUM SERPL-MCNC: 8.7 MG/DL (ref 8.5–10.1)
CHLORIDE BLD-SCNC: 98 MMOL/L (ref 94–109)
CO2 SERPL-SCNC: 27 MMOL/L (ref 20–32)
CREAT SERPL-MCNC: 0.65 MG/DL (ref 0.52–1.04)
GFR SERPL CREATININE-BSD FRML MDRD: >90 ML/MIN/1.73M2
GLUCOSE BLD-MCNC: 108 MG/DL (ref 70–99)
HGB BLD-MCNC: 11.4 G/DL (ref 11.7–15.7)
POTASSIUM BLD-SCNC: 4 MMOL/L (ref 3.4–5.3)
SODIUM SERPL-SCNC: 131 MMOL/L (ref 133–144)

## 2022-02-03 PROCEDURE — 36415 COLL VENOUS BLD VENIPUNCTURE: CPT

## 2022-02-03 PROCEDURE — 85018 HEMOGLOBIN: CPT

## 2022-02-03 PROCEDURE — 80048 BASIC METABOLIC PNL TOTAL CA: CPT

## 2022-02-11 ENCOUNTER — TELEPHONE (OUTPATIENT)
Dept: GASTROENTEROLOGY | Facility: CLINIC | Age: 57
End: 2022-02-11
Payer: COMMERCIAL

## 2022-02-11 NOTE — TELEPHONE ENCOUNTER
Caller: Juan Miguel from [facility]    Procedure: EGD    Date, Location, and Surgeon of Procedure Cancelled: Dylan, 3/23/22 [facility]    Ordering Provider:Moustapha    Reason for cancel (please be detailed, any staff messages or encounters to note?): conflict with date per Juan Miguel. I will send her new info in her FileTrekhart        Rescheduled: y     If rescheduled:    Date: 3/22/22   Location: [facility]   Note any change or update to original order/sedation: MAC

## 2022-02-16 DIAGNOSIS — Z11.59 ENCOUNTER FOR SCREENING FOR OTHER VIRAL DISEASES: Primary | ICD-10-CM

## 2022-02-28 ENCOUNTER — LAB (OUTPATIENT)
Dept: LAB | Facility: CLINIC | Age: 57
End: 2022-02-28
Payer: COMMERCIAL

## 2022-02-28 DIAGNOSIS — B18.2 CHRONIC HEPATITIS C WITHOUT HEPATIC COMA (H): ICD-10-CM

## 2022-02-28 LAB — HGB BLD-MCNC: 11.2 G/DL (ref 11.7–15.7)

## 2022-02-28 PROCEDURE — 36415 COLL VENOUS BLD VENIPUNCTURE: CPT

## 2022-02-28 PROCEDURE — 85018 HEMOGLOBIN: CPT

## 2022-03-12 ENCOUNTER — TELEPHONE (OUTPATIENT)
Dept: GASTROENTEROLOGY | Facility: CLINIC | Age: 57
End: 2022-03-12

## 2022-03-12 NOTE — TELEPHONE ENCOUNTER
Patient scheduled for EGD on 3/22/22.     Covid test scheduled: 3/18/22    Arrival time: 0815    Facility location: [facility]    Sedation type: CS    Indication for procedure: variceal screening    Anticoagulations? no     Pre visit planning completed.    Brittaney Gil RN

## 2022-03-14 NOTE — TELEPHONE ENCOUNTER
Staff message that patient returned call.     Pre assessment questions completed for upcoming EGD procedure scheduled on 3/22/22    COVID test scheduled 3/18/22    Reviewed procedural arrival time 0815 and facility location [facility].    Designated  policy reviewed. Instructed to have someone stay 6 hours post procedure.     Reviewed EGD prep instructions with patient. NPO six hours prior to procedure    Patient verbalized understanding and had no questions or concerns at this time.    Brittaney Gil RN

## 2022-03-14 NOTE — TELEPHONE ENCOUNTER
Attempted to contact patient for pre assessment questions. No answer.     Left message to return call to [phone] #2    Brittaney Gil RN

## 2022-03-15 ENCOUNTER — OFFICE VISIT (OUTPATIENT)
Dept: GASTROENTEROLOGY | Facility: CLINIC | Age: 57
End: 2022-03-15
Attending: PHYSICIAN ASSISTANT
Payer: COMMERCIAL

## 2022-03-15 ENCOUNTER — LAB (OUTPATIENT)
Dept: LAB | Facility: CLINIC | Age: 57
End: 2022-03-15
Payer: COMMERCIAL

## 2022-03-15 VITALS
WEIGHT: 126.9 LBS | DIASTOLIC BLOOD PRESSURE: 55 MMHG | HEART RATE: 63 BPM | BODY MASS INDEX: 24.58 KG/M2 | TEMPERATURE: 97.9 F | OXYGEN SATURATION: 100 % | SYSTOLIC BLOOD PRESSURE: 102 MMHG

## 2022-03-15 DIAGNOSIS — K70.30 ALCOHOLIC CIRRHOSIS OF LIVER WITHOUT ASCITES (H): ICD-10-CM

## 2022-03-15 DIAGNOSIS — K74.60 CIRRHOSIS OF LIVER WITHOUT ASCITES, UNSPECIFIED HEPATIC CIRRHOSIS TYPE (H): ICD-10-CM

## 2022-03-15 DIAGNOSIS — B18.2 CHRONIC HEPATITIS C WITHOUT HEPATIC COMA (H): Primary | ICD-10-CM

## 2022-03-15 DIAGNOSIS — B18.2 CHRONIC HEPATITIS C WITHOUT HEPATIC COMA (H): ICD-10-CM

## 2022-03-15 LAB — HGB BLD-MCNC: 11.8 G/DL (ref 11.7–15.7)

## 2022-03-15 PROCEDURE — 36415 COLL VENOUS BLD VENIPUNCTURE: CPT | Performed by: PATHOLOGY

## 2022-03-15 PROCEDURE — G0463 HOSPITAL OUTPT CLINIC VISIT: HCPCS

## 2022-03-15 PROCEDURE — 99000 SPECIMEN HANDLING OFFICE-LAB: CPT | Performed by: PATHOLOGY

## 2022-03-15 PROCEDURE — 85018 HEMOGLOBIN: CPT | Performed by: PATHOLOGY

## 2022-03-15 PROCEDURE — 87522 HEPATITIS C REVRS TRNSCRPJ: CPT | Mod: 90 | Performed by: PATHOLOGY

## 2022-03-15 PROCEDURE — 99214 OFFICE O/P EST MOD 30 MIN: CPT | Performed by: PHYSICIAN ASSISTANT

## 2022-03-15 ASSESSMENT — PAIN SCALES - GENERAL: PAINLEVEL: NO PAIN (0)

## 2022-03-15 NOTE — NURSING NOTE
Chief Complaint   Patient presents with     RECHECK     3 mo f/u       /55   Pulse 63   Temp 97.9  F (36.6  C) (Oral)   Wt 57.6 kg (126 lb 14.4 oz)   SpO2 100%   BMI 24.58 kg/m      Marcus Hubbard MA on 3/15/2022 at 12:44 PM

## 2022-03-15 NOTE — LETTER
3/15/2022     RE: Mu Gutierrez  [address] [facility] [address]    Dear Colleague,    Thank you for referring your patient, Mu Gutierrez, to the [facility] [place]. Please see a copy of my visit note below.     Hepatology Follow-up Clinic note  Mu Gutierrez   Date of Birth 1965  Date of Service 3/15/2022    Reason for follow-up: Hep C cirrhosis          Assessment/plan:   Mu Gutierrez is a 56 year old female with history of Hep C cirrhosis complicated by history of ascites, now controlled with minimal diuretics and history of small esophageal varices. No overt evidence of hepatic encephalopathy. She has two more weeks remaining of Hepatitis C treatment (Harvoni + ribavirin x 12 weeks), which she has tolerated well with good compliance. Her hemoglobin has remained stable on Ribavirin. In December, her MELD-Na was 16, no recent labs. She is up to date with HCC screening and is scheduled for variceal screening next week.     # Labs in 3 months: hepatic pahenl     # Chronic hepatitis C:   - Complete treatment with Harvoni + Ribavirin x 12 weeks    - HCV RNA pending today; repeat in 3 months    # Ascites, improved:   - Okay to trial discontinuing 50 mg spironolactone and 40 mg furosemide    # Variceal screening:    - EGD next week     # HCC screening:   - US Abdomen in 3 months    # If continued bleeding with bowel movements, recommend discussing CRS referral with PCP. Last colonoscopy in 2019.     # Follow-up in clinic in six months     Brooke Gerard PA-C   [facility] Hepatology clinic    -----------------------------------------------------       HPI:   Mu Gutierrez is a 56 year old female presenting for follow-up.     Hepatitis C   -Genotype unknown   -Diagnosed: 2010  -History: ? Ex- with Hep C  -Prior biopsy: no   -Prior treatments: naive      Cirrhosis   History of ascites s/p paracentesis  No HE or history of GI  bleed  Diagnosed   EGD: 2019, small EV    Patient was last seen by me on 12/14/2021. No recent hospitalization or ER visits.   Started Harvoni and Ribavirin on 12/30/21. Her hemoglobin has remained stable. She denies any side effects.     She continues to take 40 mg furosemide and 50 mg spironolactone. No problems with ascites since starting diuretics. Appetite is good. Regular bowel movements.     STates she may have a bleeding hemorrhoid if she passes harder stools. She states okay for a few weeks and then may bleed again for a few days.  Colonoscopy in 2019 showed a few polyps. No comment hemorrhoids.    Patient denies jaundice, lower extremity edema, abdominal distension or confusion.    Patient also denies melena, hematochezia or hematemesis. Patient denies weight loss, fevers, sweats or chills.    No alcohol in the past 3 months.     Medical hx Surgical hx   No past medical history on file. No past surgical history on file.              Medications:     Current Outpatient Medications   Medication     furosemide (LASIX) 40 MG tablet     ledipasvir-sofosbuvir (HARVONI)  MG per tablet     ribavirin (COPEGUS) 200 MG tablet     spironolactone (ALDACTONE) 100 MG tablet     No current facility-administered medications for this visit.            Allergies:     Allergies   Allergen Reactions     Shellfish-Derived Products             Review of Systems:   10 points ROS was obtained and highlighted in the HPI, otherwise negative.          Physical Exam:   VS:  /55   Pulse 63   Temp 97.9  F (36.6  C) (Oral)   Wt 57.6 kg (126 lb 14.4 oz)   SpO2 100%   BMI 24.58 kg/m        Gen: A&Ox3, NAD, well developed  HEENT: non-icteri  CV: RRR, no overt murmurs  Lung: CTA Bilatererally, no wheezing or crackles.   Lym- no palpable lymphadenopathy  Abd: soft, NT, ND,  no organomegaly.   Ext: no edema, intact pulses.   Skin: No rash,no palmar erythema, telangiectasias or jaundice  Neuro: grossly intact, no asterixis    Psych: appropriate mood and affects           Data:   Reviewed in person and significant for:    Lab Results   Component Value Date     02/03/2022      Lab Results   Component Value Date    POTASSIUM 4.0 02/03/2022     Lab Results   Component Value Date    CHLORIDE 98 02/03/2022     Lab Results   Component Value Date    CO2 27 02/03/2022     Lab Results   Component Value Date    BUN 9 02/03/2022     Lab Results   Component Value Date    CR 0.65 02/03/2022       Lab Results   Component Value Date    WBC 3.3 12/14/2021    WBC 6.5 05/18/2010     Lab Results   Component Value Date    HGB 11.8 03/15/2022    HGB 14.2 05/18/2010     Lab Results   Component Value Date    HCT 37.9 12/14/2021    HCT 42.0 05/18/2010     Lab Results   Component Value Date    MCV 96 12/14/2021    MCV 93 05/18/2010     Lab Results   Component Value Date    PLT 50 12/14/2021     05/18/2010       Lab Results   Component Value Date    AST 56 01/11/2022     05/18/2010     Lab Results   Component Value Date    ALT 46 01/11/2022     05/18/2010     Lab Results   Component Value Date    BILICONJ 0.0 05/18/2010      Lab Results   Component Value Date    BILITOTAL 2.0 01/11/2022    BILITOTAL 0.3 05/18/2010       Lab Results   Component Value Date    ALBUMIN 3.5 01/11/2022    ALBUMIN 3.9 05/18/2010     Lab Results   Component Value Date    PROTTOTAL 7.8 01/11/2022    PROTTOTAL 7.9 05/18/2010      Lab Results   Component Value Date    ALKPHOS 139 01/11/2022    ALKPHOS 87 05/18/2010       Lab Results   Component Value Date    INR 1.43 12/14/2021    INR 1.03 05/18/2010     Exam: US ABDOMEN COMPLETE, 12/20/2021 10:04 AM     Indication: Patient at high risk for HCC. cirrhosis HCC screening;  Chronic hepatitis C without hepatic coma (H); Cirrhosis of liver with  ascites, unspecified hepatic cirrhosis type (H); Cirrhosis of liver  with ascites, unspecified hepatic cirrhosis type (H)     Comparison: 6/28/2010     Technique: The abdomen  was scanned in the standard fashion with  specialized ultrasound transducer(s) using both gray scale and limited  color/spectral Doppler techniques.     Findings:     Liver:     Coarsened echotexture and nodular surface contour. There is no focal  mass. Measures up to 12 cm. The main portal vein is patent with  antegrade flow. Recannulized paraumbilical vein.     US visualization score: A - No or minimal limitations     Gallbladder: The gallbladder is well distended and of normal  morphology. There is no wall thickening, pericholecystic fluid,  sonographic Schmidt's sign nor evidence for cholelithiasis.     Bile Ducts: Both the intra- and extrahepatic biliary system are of  normal caliber. The common bile duct measures 8 mm in diameter.     Pancreas: Visualized portions of the head and body of the pancreas are  unremarkable.      Kidneys: Both kidneys are of normal echotexture, without mass nor  hydronephrosis.  The craniocaudal dimensions are: right- 10.8 cm,  left- 11.6 cm.     Spleen: The spleen is normal in size, measuring 11.3 cm in sagittal  dimension.     Aorta and IVC: The visualized portions of the aorta and IVC are  unremarkable. The aorta measures 2.4 cm in diameter and the IVC  measures 1.4 cm in diameter.     Fluid: No evidence of ascites or pleural effusions.                                                                    Impression:  1. Cirrhosis without focal liver lesion. Evidence of portal  hypertension with a recannulized paraumbilical vein.  a. LI-RADS US Category: US-1 Negative: No US evidence of HCC  b. Recommend continued surveillance US.  2. Mild dilation of the common bile duct, measuring up to 8mm. May  consider further evaluation with MRCP if clinically relevant.    Again, thank you for allowing me to participate in the care of your patient.      Sincerely,      Brooke Gerard PA-C

## 2022-03-15 NOTE — PROGRESS NOTES
Hepatology Follow-up Clinic note  Mu Gutierrez   Date of Birth 1965  Date of Service 3/15/2022    Reason for follow-up: Hep C cirrhosis          Assessment/plan:   Mu Gutierrez is a 56 year old female with history of Hep C cirrhosis complicated by history of ascites, now controlled with minimal diuretics and history of small esophageal varices. No overt evidence of hepatic encephalopathy. She has two more weeks remaining of Hepatitis C treatment (Harvoni + ribavirin x 12 weeks), which she has tolerated well with good compliance. Her hemoglobin has remained stable on Ribavirin. In December, her MELD-Na was 16, no recent labs. She is up to date with HCC screening and is scheduled for variceal screening next week.     # Labs in 3 months: hepatic pahenl     # Chronic hepatitis C:   - Complete treatment with Harvoni + Ribavirin x 12 weeks    - HCV RNA pending today; repeat in 3 months    # Ascites, improved:   - Okay to trial discontinuing 50 mg spironolactone and 40 mg furosemide    # Variceal screening:    - EGD next week     # HCC screening:   - US Abdomen in 3 months    # If continued bleeding with bowel movements, recommend discussing CRS referral with PCP. Last colonoscopy in 2019.     # Follow-up in clinic in six months     Brooke Gerard PA-C   [facility] Hepatology clinic    -----------------------------------------------------       HPI:   Mu Gutierrez is a 56 year old female presenting for follow-up.     Hepatitis C   -Genotype unknown   -Diagnosed: 2010  -History: ? Ex- with Hep C  -Prior biopsy: no   -Prior treatments: naive      Cirrhosis   History of ascites s/p paracentesis  No HE or history of GI bleed  Diagnosed   EGD: 2019, small EV    Patient was last seen by me on 12/14/2021. No recent hospitalization or ER visits.   Started Harvoni and Ribavirin on 12/30/21. Her hemoglobin has remained stable. She denies any side effects.     She continues to take 40 mg  furosemide and 50 mg spironolactone. No problems with ascites since starting diuretics. Appetite is good. Regular bowel movements.     STates she may have a bleeding hemorrhoid if she passes harder stools. She states okay for a few weeks and then may bleed again for a few days.  Colonoscopy in 2019 showed a few polyps. No comment hemorrhoids.    Patient denies jaundice, lower extremity edema, abdominal distension or confusion.    Patient also denies melena, hematochezia or hematemesis. Patient denies weight loss, fevers, sweats or chills.    No alcohol in the past 3 months.     Medical hx Surgical hx   No past medical history on file. No past surgical history on file.              Medications:     Current Outpatient Medications   Medication     furosemide (LASIX) 40 MG tablet     ledipasvir-sofosbuvir (HARVONI)  MG per tablet     ribavirin (COPEGUS) 200 MG tablet     spironolactone (ALDACTONE) 100 MG tablet     No current facility-administered medications for this visit.            Allergies:     Allergies   Allergen Reactions     Shellfish-Derived Products             Review of Systems:   10 points ROS was obtained and highlighted in the HPI, otherwise negative.          Physical Exam:   VS:  /55   Pulse 63   Temp 97.9  F (36.6  C) (Oral)   Wt 57.6 kg (126 lb 14.4 oz)   SpO2 100%   BMI 24.58 kg/m        Gen: A&Ox3, NAD, well developed  HEENT: non-icteri  CV: RRR, no overt murmurs  Lung: CTA Bilatererally, no wheezing or crackles.   Lym- no palpable lymphadenopathy  Abd: soft, NT, ND,  no organomegaly.   Ext: no edema, intact pulses.   Skin: No rash,no palmar erythema, telangiectasias or jaundice  Neuro: grossly intact, no asterixis   Psych: appropriate mood and affects           Data:   Reviewed in person and significant for:    Lab Results   Component Value Date     02/03/2022      Lab Results   Component Value Date    POTASSIUM 4.0 02/03/2022     Lab Results   Component Value Date     CHLORIDE 98 02/03/2022     Lab Results   Component Value Date    CO2 27 02/03/2022     Lab Results   Component Value Date    BUN 9 02/03/2022     Lab Results   Component Value Date    CR 0.65 02/03/2022       Lab Results   Component Value Date    WBC 3.3 12/14/2021    WBC 6.5 05/18/2010     Lab Results   Component Value Date    HGB 11.8 03/15/2022    HGB 14.2 05/18/2010     Lab Results   Component Value Date    HCT 37.9 12/14/2021    HCT 42.0 05/18/2010     Lab Results   Component Value Date    MCV 96 12/14/2021    MCV 93 05/18/2010     Lab Results   Component Value Date    PLT 50 12/14/2021     05/18/2010       Lab Results   Component Value Date    AST 56 01/11/2022     05/18/2010     Lab Results   Component Value Date    ALT 46 01/11/2022     05/18/2010     Lab Results   Component Value Date    BILICONJ 0.0 05/18/2010      Lab Results   Component Value Date    BILITOTAL 2.0 01/11/2022    BILITOTAL 0.3 05/18/2010       Lab Results   Component Value Date    ALBUMIN 3.5 01/11/2022    ALBUMIN 3.9 05/18/2010     Lab Results   Component Value Date    PROTTOTAL 7.8 01/11/2022    PROTTOTAL 7.9 05/18/2010      Lab Results   Component Value Date    ALKPHOS 139 01/11/2022    ALKPHOS 87 05/18/2010       Lab Results   Component Value Date    INR 1.43 12/14/2021    INR 1.03 05/18/2010     Exam: US ABDOMEN COMPLETE, 12/20/2021 10:04 AM     Indication: Patient at high risk for HCC. cirrhosis HCC screening;  Chronic hepatitis C without hepatic coma (H); Cirrhosis of liver with  ascites, unspecified hepatic cirrhosis type (H); Cirrhosis of liver  with ascites, unspecified hepatic cirrhosis type (H)     Comparison: 6/28/2010     Technique: The abdomen was scanned in the standard fashion with  specialized ultrasound transducer(s) using both gray scale and limited  color/spectral Doppler techniques.     Findings:     Liver:     Coarsened echotexture and nodular surface contour. There is no focal  mass. Measures up  to 12 cm. The main portal vein is patent with  antegrade flow. Recannulized paraumbilical vein.     US visualization score: A - No or minimal limitations     Gallbladder: The gallbladder is well distended and of normal  morphology. There is no wall thickening, pericholecystic fluid,  sonographic Schmidt's sign nor evidence for cholelithiasis.     Bile Ducts: Both the intra- and extrahepatic biliary system are of  normal caliber. The common bile duct measures 8 mm in diameter.     Pancreas: Visualized portions of the head and body of the pancreas are  unremarkable.      Kidneys: Both kidneys are of normal echotexture, without mass nor  hydronephrosis.  The craniocaudal dimensions are: right- 10.8 cm,  left- 11.6 cm.     Spleen: The spleen is normal in size, measuring 11.3 cm in sagittal  dimension.     Aorta and IVC: The visualized portions of the aorta and IVC are  unremarkable. The aorta measures 2.4 cm in diameter and the IVC  measures 1.4 cm in diameter.     Fluid: No evidence of ascites or pleural effusions.                                                                      Impression:  1. Cirrhosis without focal liver lesion. Evidence of portal  hypertension with a recannulized paraumbilical vein.  a. LI-RADS US Category: US-1 Negative: No US evidence of HCC  b. Recommend continued surveillance US.  2. Mild dilation of the common bile duct, measuring up to 8mm. May  consider further evaluation with MRCP if clinically relevant.

## 2022-03-16 LAB — HCV RNA SERPL NAA+PROBE-ACNC: NOT DETECTED IU/ML

## 2022-03-18 ENCOUNTER — LAB (OUTPATIENT)
Dept: LAB | Facility: CLINIC | Age: 57
End: 2022-03-18
Attending: PHYSICIAN ASSISTANT
Payer: COMMERCIAL

## 2022-03-18 DIAGNOSIS — R18.8 CIRRHOSIS OF LIVER WITH ASCITES, UNSPECIFIED HEPATIC CIRRHOSIS TYPE (H): ICD-10-CM

## 2022-03-18 DIAGNOSIS — K74.60 CIRRHOSIS OF LIVER WITH ASCITES, UNSPECIFIED HEPATIC CIRRHOSIS TYPE (H): ICD-10-CM

## 2022-03-18 DIAGNOSIS — Z11.59 ENCOUNTER FOR SCREENING FOR OTHER VIRAL DISEASES: ICD-10-CM

## 2022-03-18 PROCEDURE — U0003 INFECTIOUS AGENT DETECTION BY NUCLEIC ACID (DNA OR RNA); SEVERE ACUTE RESPIRATORY SYNDROME CORONAVIRUS 2 (SARS-COV-2) (CORONAVIRUS DISEASE [COVID-19]), AMPLIFIED PROBE TECHNIQUE, MAKING USE OF HIGH THROUGHPUT TECHNOLOGIES AS DESCRIBED BY CMS-2020-01-R: HCPCS

## 2022-03-18 PROCEDURE — U0005 INFEC AGEN DETEC AMPLI PROBE: HCPCS

## 2022-03-19 LAB — SARS-COV-2 RNA RESP QL NAA+PROBE: NEGATIVE

## 2022-03-22 ENCOUNTER — HOSPITAL ENCOUNTER (OUTPATIENT)
Facility: AMBULATORY SURGERY CENTER | Age: 57
Discharge: HOME | End: 2022-03-22
Attending: INTERNAL MEDICINE
Payer: COMMERCIAL

## 2022-03-22 ENCOUNTER — ANESTHESIA (OUTPATIENT)
Dept: SURGERY | Facility: AMBULATORY SURGERY CENTER | Age: 57
End: 2022-03-22
Payer: COMMERCIAL

## 2022-03-22 ENCOUNTER — ANESTHESIA EVENT (OUTPATIENT)
Dept: SURGERY | Facility: AMBULATORY SURGERY CENTER | Age: 57
End: 2022-03-22
Payer: COMMERCIAL

## 2022-03-22 VITALS — HEART RATE: 75 BPM

## 2022-03-22 VITALS
BODY MASS INDEX: 24.74 KG/M2 | RESPIRATION RATE: 14 BRPM | HEART RATE: 72 BPM | TEMPERATURE: 97.6 F | OXYGEN SATURATION: 98 % | DIASTOLIC BLOOD PRESSURE: 74 MMHG | HEIGHT: 60 IN | SYSTOLIC BLOOD PRESSURE: 119 MMHG | WEIGHT: 126 LBS

## 2022-03-22 LAB — UPPER GI ENDOSCOPY: NORMAL

## 2022-03-22 PROCEDURE — 43235 EGD DIAGNOSTIC BRUSH WASH: CPT

## 2022-03-22 RX ORDER — FLUMAZENIL 0.1 MG/ML
0.2 INJECTION, SOLUTION INTRAVENOUS
Status: CANCELLED | OUTPATIENT
Start: 2022-03-22 | End: 2022-03-22

## 2022-03-22 RX ORDER — NALOXONE HYDROCHLORIDE 0.4 MG/ML
0.2 INJECTION, SOLUTION INTRAMUSCULAR; INTRAVENOUS; SUBCUTANEOUS
Status: CANCELLED | OUTPATIENT
Start: 2022-03-22

## 2022-03-22 RX ORDER — SIMETHICONE
LIQUID (ML) MISCELLANEOUS PRN
Status: DISCONTINUED | OUTPATIENT
Start: 2022-03-22 | End: 2022-03-22 | Stop reason: HOSPADM

## 2022-03-22 RX ORDER — ONDANSETRON 4 MG/1
4 TABLET, ORALLY DISINTEGRATING ORAL EVERY 6 HOURS PRN
Status: CANCELLED | OUTPATIENT
Start: 2022-03-22

## 2022-03-22 RX ORDER — PROCHLORPERAZINE MALEATE 10 MG
10 TABLET ORAL EVERY 6 HOURS PRN
Status: CANCELLED | OUTPATIENT
Start: 2022-03-22

## 2022-03-22 RX ORDER — LIDOCAINE 40 MG/G
CREAM TOPICAL
Status: DISCONTINUED | OUTPATIENT
Start: 2022-03-22 | End: 2022-03-23 | Stop reason: HOSPADM

## 2022-03-22 RX ORDER — NALOXONE HYDROCHLORIDE 0.4 MG/ML
0.4 INJECTION, SOLUTION INTRAMUSCULAR; INTRAVENOUS; SUBCUTANEOUS
Status: CANCELLED | OUTPATIENT
Start: 2022-03-22

## 2022-03-22 RX ORDER — SODIUM CHLORIDE, SODIUM LACTATE, POTASSIUM CHLORIDE, CALCIUM CHLORIDE 600; 310; 30; 20 MG/100ML; MG/100ML; MG/100ML; MG/100ML
INJECTION, SOLUTION INTRAVENOUS CONTINUOUS
Status: DISCONTINUED | OUTPATIENT
Start: 2022-03-22 | End: 2022-03-23 | Stop reason: HOSPADM

## 2022-03-22 RX ORDER — PROPOFOL 10 MG/ML
INJECTION, EMULSION INTRAVENOUS PRN
Status: DISCONTINUED | OUTPATIENT
Start: 2022-03-22 | End: 2022-03-22

## 2022-03-22 RX ORDER — ONDANSETRON 2 MG/ML
4 INJECTION INTRAMUSCULAR; INTRAVENOUS EVERY 6 HOURS PRN
Status: CANCELLED | OUTPATIENT
Start: 2022-03-22

## 2022-03-22 RX ORDER — GLYCOPYRROLATE 0.2 MG/ML
INJECTION, SOLUTION INTRAMUSCULAR; INTRAVENOUS PRN
Status: DISCONTINUED | OUTPATIENT
Start: 2022-03-22 | End: 2022-03-22

## 2022-03-22 RX ORDER — PROPOFOL 10 MG/ML
INJECTION, EMULSION INTRAVENOUS CONTINUOUS PRN
Status: DISCONTINUED | OUTPATIENT
Start: 2022-03-22 | End: 2022-03-22

## 2022-03-22 RX ORDER — ONDANSETRON 2 MG/ML
4 INJECTION INTRAMUSCULAR; INTRAVENOUS
Status: DISCONTINUED | OUTPATIENT
Start: 2022-03-22 | End: 2022-03-23 | Stop reason: HOSPADM

## 2022-03-22 RX ADMIN — PROPOFOL 250 MCG/KG/MIN: 10 INJECTION, EMULSION INTRAVENOUS at 09:53

## 2022-03-22 RX ADMIN — PROPOFOL 50 MG: 10 INJECTION, EMULSION INTRAVENOUS at 09:53

## 2022-03-22 RX ADMIN — SODIUM CHLORIDE, SODIUM LACTATE, POTASSIUM CHLORIDE, CALCIUM CHLORIDE: 600; 310; 30; 20 INJECTION, SOLUTION INTRAVENOUS at 08:49

## 2022-03-22 RX ADMIN — GLYCOPYRROLATE 0.2 MG: 0.2 INJECTION, SOLUTION INTRAMUSCULAR; INTRAVENOUS at 09:53

## 2022-03-22 NOTE — H&P
Mu Gutierrez  [id number]  female  56 year old      Reason for procedure/surgery: esophageal variceal screening    Patient Active Problem List   Diagnosis     CARDIOVASCULAR SCREENING; LDL GOAL LESS THAN 160       Past Surgical History:  History reviewed. No pertinent surgical history.    Past Medical History: History reviewed. No pertinent past medical history.    Social History:   Social History     Tobacco Use     Smoking status: Current Some Day Smoker     Packs/day: 0.50     Types: Cigarettes     Smokeless tobacco: Never Used   Substance Use Topics     Alcohol use: No       Family History:   Family History   Problem Relation Age of Onset     Cancer Maternal Grandfather        Allergies:   Allergies   Allergen Reactions     Shellfish-Derived Products        Active Medications:   Current Outpatient Medications   Medication Sig Dispense Refill     furosemide (LASIX) 40 MG tablet Take 20 mg by mouth daily 60 tablet 5     ledipasvir-sofosbuvir (HARVONI)  MG per tablet Take 1 tablet by mouth daily 28 tablet 2     ribavirin (COPEGUS) 200 MG tablet Take 1 tablet (200 mg) by mouth 3 times daily (with meals) 90 tablet 2     spironolactone (ALDACTONE) 100 MG tablet Take 50 mg by mouth daily 60 tablet 5       Systemic Review:   ROS otherwise negative    Physical Examination:   Vital Signs: /79 (BP Location: Right arm)   Pulse 63   Temp 96.8  F (36  C) (Temporal)   Resp 18   Ht 1.524 m (5')   Wt 57.2 kg (126 lb)   LMP 03/23/2010   SpO2 98%   BMI 24.61 kg/m    GENERAL: healthy, alert and no distress  HENT: oropharynx clear and oral mucous membranes moist, Mallampati II  NECK: Normal ROM  RESP: lungs clear to auscultation - no rales, rhonchi or wheezes  CV: regular rate and rhythm, normal S1 S2,   ABDOMEN: soft, nontender, and bowel sounds normal  MS: no gross musculoskeletal defects noted, no edema      Plan: Appropriate to proceed as scheduled.      Desi Magdaleno,  MD  3/22/2022    PCP:  Nicanor Allred

## 2022-03-22 NOTE — ANESTHESIA CARE TRANSFER NOTE
Patient: Mu Gutierrez    Procedure: Procedure(s):  ESOPHAGOGASTRODUODENOSCOPY (EGD)       Diagnosis: Chronic hepatitis C without hepatic coma (H) [B18.2]  Cirrhosis of liver with ascites, unspecified hepatic cirrhosis type (H) [K74.60, R18.8]  Diagnosis Additional Information: No value filed.    Anesthesia Type:   No value filed.     Note:    Oropharynx: spontaneously breathing  Level of Consciousness: awake  Oxygen Supplementation: room air    Independent Airway: airway patency satisfactory and stable  Dentition: dentition unchanged  Vital Signs Stable: post-procedure vital signs reviewed and stable  Report to RN Given: handoff report given  Patient transferred to: Phase II    Handoff Report: Identifed the Patient, Identified the Reponsible Provider, Reviewed the pertinent medical history, Discussed the surgical course, Reviewed Intra-OP anesthesia mangement and issues during anesthesia, Set expectations for post-procedure period and Allowed opportunity for questions and acknowledgement of understanding      Vitals:  Vitals Value Taken Time   BP     Temp     Pulse     Resp     SpO2         Electronically Signed By: CAR Perez CRNA  March 22, 2022  10:14 AM

## 2022-03-26 NOTE — ANESTHESIA PREPROCEDURE EVALUATION
Anesthesia Pre-Procedure Evaluation    Patient: Mu Gutierrez   MRN: [id number] : 1965        Procedure : Procedure(s):  ESOPHAGOGASTRODUODENOSCOPY (EGD)          History reviewed. No pertinent past medical history.   Past Surgical History:   Procedure Laterality Date     ESOPHAGOSCOPY, GASTROSCOPY, DUODENOSCOPY (EGD), COMBINED N/A 3/22/2022    Procedure: ESOPHAGOGASTRODUODENOSCOPY (EGD);  Surgeon: Desi Magdaleno MD;  Location: [facility] OR      Allergies   Allergen Reactions     Shellfish-Derived Products       Social History     Tobacco Use     Smoking status: Current Some Day Smoker     Packs/day: 0.50     Types: Cigarettes     Smokeless tobacco: Never Used   Substance Use Topics     Alcohol use: No      Wt Readings from Last 1 Encounters:   22 57.2 kg (126 lb)        Anesthesia Evaluation   Pt has had prior anesthetic.     No history of anesthetic complications       ROS/MED HX  ENT/Pulmonary:  - neg pulmonary ROS     Neurologic:  - neg neurologic ROS     Cardiovascular:  - neg cardiovascular ROS     METS/Exercise Tolerance: >4 METS    Hematologic:  - neg hematologic  ROS     Musculoskeletal:  - neg musculoskeletal ROS     GI/Hepatic:     (+) hepatitis type C, liver disease,     Renal/Genitourinary:  - neg Renal ROS     Endo:  - neg endo ROS     Psychiatric/Substance Use:  - neg psychiatric ROS     Infectious Disease:  - neg infectious disease ROS     Malignancy:  - neg malignancy ROS     Other:  - neg other ROS          Physical Exam    Airway  airway exam normal           Respiratory Devices and Support         Dental  no notable dental history         Cardiovascular   cardiovascular exam normal          Pulmonary   pulmonary exam normal                OUTSIDE LABS:  CBC:   Lab Results   Component Value Date    WBC 3.3 (L) 2021    WBC 6.5 2010    HGB 11.8 03/15/2022    HGB 11.2 (L) 2022    HCT 37.9 2021    HCT 42.0 2010    PLT 50 (L) 2021    PLT  134 (L) 05/18/2010     BMP:   Lab Results   Component Value Date     (L) 02/03/2022     (L) 01/25/2022    POTASSIUM 4.0 02/03/2022    POTASSIUM 4.1 01/25/2022    CHLORIDE 98 02/03/2022    CHLORIDE 93 (L) 01/25/2022    CO2 27 02/03/2022    CO2 26 01/25/2022    BUN 9 02/03/2022    BUN 9 01/25/2022    CR 0.65 02/03/2022    CR 0.60 01/25/2022     (H) 02/03/2022    GLC 97 01/25/2022     COAGS:   Lab Results   Component Value Date    INR 1.43 (H) 12/14/2021     POC: No results found for: BGM, HCG, HCGS  HEPATIC:   Lab Results   Component Value Date    ALBUMIN 3.5 01/11/2022    PROTTOTAL 7.8 01/11/2022    ALT 46 01/11/2022    AST 56 (H) 01/11/2022    ALKPHOS 139 01/11/2022    BILITOTAL 2.0 (H) 01/11/2022     OTHER:   Lab Results   Component Value Date    A1C 4.5 12/14/2021    PAZ 8.7 02/03/2022    TSH 8.54 (H) 05/18/2010    T4 0.96 05/18/2010       Anesthesia Plan    ASA Status:  2   NPO Status:  NPO Appropriate    Anesthesia Type: MAC.     - Reason for MAC: straight local not clinically adequate   Induction: N/a.   Maintenance: TIVA.        Consents    Anesthesia Plan(s) and associated risks, benefits, and realistic alternatives discussed. Questions answered and patient/representative(s) expressed understanding.    - Discussed:     - Discussed with:  Patient         Postoperative Care       PONV prophylaxis: Ondansetron (or other 5HT-3)     Comments:                Rony Virgen MD

## 2022-03-26 NOTE — ANESTHESIA POSTPROCEDURE EVALUATION
Patient: Mu Gutierrez    Procedure: Procedure(s):  ESOPHAGOGASTRODUODENOSCOPY (EGD)       Anesthesia Type:  MAC    Note:  Disposition: Outpatient   Postop Pain Control: Uneventful            Sign Out: Well controlled pain   PONV: No   Neuro/Psych: Uneventful            Sign Out: Acceptable/Baseline neuro status   Airway/Respiratory: Uneventful            Sign Out: Acceptable/Baseline resp. status   CV/Hemodynamics: Uneventful            Sign Out: Acceptable CV status; No obvious hypovolemia; No obvious fluid overload   Other NRE: NONE   DID A NON-ROUTINE EVENT OCCUR? No           Last vitals:  Vitals Value Taken Time   /74 03/22/22 1030   Temp 36.4  C (97.6  F) 03/22/22 1030   Pulse 72 03/22/22 1030   Resp 14 03/22/22 1030   SpO2 98 % 03/22/22 1030       Electronically Signed By: Rony Virgen MD  March 25, 2022  7:02 PM

## 2022-06-28 ENCOUNTER — LAB (OUTPATIENT)
Dept: LAB | Facility: CLINIC | Age: 57
End: 2022-06-28
Payer: COMMERCIAL

## 2022-06-28 DIAGNOSIS — B18.2 CHRONIC HEPATITIS C WITHOUT HEPATIC COMA (H): ICD-10-CM

## 2022-06-28 LAB
ALBUMIN SERPL-MCNC: 3.4 G/DL (ref 3.4–5)
ALP SERPL-CCNC: 140 U/L (ref 40–150)
ALT SERPL W P-5'-P-CCNC: 31 U/L (ref 0–50)
ANION GAP SERPL CALCULATED.3IONS-SCNC: 7 MMOL/L (ref 3–14)
AST SERPL W P-5'-P-CCNC: 35 U/L (ref 0–45)
BILIRUB DIRECT SERPL-MCNC: 0.3 MG/DL (ref 0–0.2)
BILIRUB SERPL-MCNC: 0.9 MG/DL (ref 0.2–1.3)
BUN SERPL-MCNC: 9 MG/DL (ref 7–30)
CALCIUM SERPL-MCNC: 8.7 MG/DL (ref 8.5–10.1)
CHLORIDE BLD-SCNC: 100 MMOL/L (ref 94–109)
CO2 SERPL-SCNC: 27 MMOL/L (ref 20–32)
CREAT SERPL-MCNC: 0.59 MG/DL (ref 0.52–1.04)
GFR SERPL CREATININE-BSD FRML MDRD: >90 ML/MIN/1.73M2
GLUCOSE BLD-MCNC: 218 MG/DL (ref 70–99)
HGB BLD-MCNC: 13.4 G/DL (ref 11.7–15.7)
POTASSIUM BLD-SCNC: 3.9 MMOL/L (ref 3.4–5.3)
PROT SERPL-MCNC: 7.4 G/DL (ref 6.8–8.8)
SODIUM SERPL-SCNC: 134 MMOL/L (ref 133–144)

## 2022-06-28 PROCEDURE — 80053 COMPREHEN METABOLIC PANEL: CPT

## 2022-06-28 PROCEDURE — 85018 HEMOGLOBIN: CPT

## 2022-06-28 PROCEDURE — 87522 HEPATITIS C REVRS TRNSCRPJ: CPT

## 2022-06-28 PROCEDURE — 82248 BILIRUBIN DIRECT: CPT

## 2022-06-28 PROCEDURE — 36415 COLL VENOUS BLD VENIPUNCTURE: CPT

## 2022-06-30 LAB — HCV RNA SERPL NAA+PROBE-ACNC: NOT DETECTED IU/ML

## 2022-10-16 ENCOUNTER — HEALTH MAINTENANCE LETTER (OUTPATIENT)
Age: 57
End: 2022-10-16

## 2023-04-01 ENCOUNTER — HEALTH MAINTENANCE LETTER (OUTPATIENT)
Age: 58
End: 2023-04-01

## 2024-01-13 ENCOUNTER — HEALTH MAINTENANCE LETTER (OUTPATIENT)
Age: 59
End: 2024-01-13

## 2024-06-01 ENCOUNTER — HEALTH MAINTENANCE LETTER (OUTPATIENT)
Age: 59
End: 2024-06-01

## 2025-06-14 ENCOUNTER — HEALTH MAINTENANCE LETTER (OUTPATIENT)
Age: 60
End: 2025-06-14

## (undated) DEVICE — KIT ENDO TURNOVER/PROCEDURE CARRY-ON 101822

## (undated) DEVICE — SYR 30ML SLIP TIP W/O NDL 302833

## (undated) DEVICE — GOWN IMPERVIOUS 2XL BLUE

## (undated) DEVICE — TUBING SUCTION 12"X1/4" N612

## (undated) DEVICE — ENDO BITE BLOCK ADULT OMNI-BLOC

## (undated) DEVICE — SUCTION CATH AIRLIFE TRI-FLO W/CONTROL PORT 14FR  T60C

## (undated) DEVICE — GLOVE EXAM NITRILE LG PF LATEX FREE 5064

## (undated) DEVICE — SOL WATER IRRIG 500ML BOTTLE 2F7113

## (undated) DEVICE — SUCTION MANIFOLD NEPTUNE 2 SYS 1 PORT 702-025-000